# Patient Record
(demographics unavailable — no encounter records)

---

## 2024-12-23 NOTE — PHYSICAL EXAM
[Chin in Prone Position] : chin in prone position  [Chest up in Prone] : chest up in prone [Up on Forearms Prone] : up on forearms prone [Roll Prone to Supine] : roll prone to supine [Roll Supine to Prone] : rolls supine to prone [Sits With Arm Support] : sits with arm support [Unfisted] : unfisted [Manipulates Fingers] : manipulates fingers [Transfer] : transfers objects [Unilateral Reach/Grasp] : unilaterally reaches/grasps  [Alert To Sounds] : alert to sounds [Soothes When Picked Up] : soothes when picked up  [Social Smile] : has a social smile [Orients To Voice] : orients to voice [Gesture Language] : gestures language [Babbling] : babbling ["Rashid" Appropriately] : says "Rashid" appropriately ["Mama" Appropriately] : says "Mama" appropriately [Babinski] : normal Babinski [Palmar Grasp] : normal Palmar Grasp  [Plantar Grasp] : normal Plantar Grasp [Automatic Walking] : normal Automatic Walking [Placing UE] : normal Placing UE [Placing LE] : normal Placing LE  [Righting Reflex] : normal righting reflex [Lateral Protective] : normal lateral protective [Normal] : sensation is intact to light touch [de-identified] : sits unsupported  [Head Lag] : no head lag [Neck Tone] : abnormal neck tone

## 2024-12-23 NOTE — HISTORY OF PRESENT ILLNESS
[Gestational Age: ___] : Gestational Age in Weeks: [unfilled] [Chronological Age: ___] : Chronological Age in Months: [unfilled] [No Parental Concerns] : no parental concerns [None] : None [de-identified] : Damian is an ex-preemie, 36-weeker, due to PROM.  He was born . Damian was  in the NICU for 21 days. He was having apnea issues. He did not have any feeding issues. Mom doesn't have any concerns now. He has been doing well since he was discharge. He has a mild murmur and was referred to cardiology and has an appointment .  Eating/Elimination: formula Enfamil Neuro Pro 5 oz every 4 hours, some table food and some pureed food.  No swallowing issues, no choking. He doesn't have constipation. He has regular BM for age.  Sleeping: he sleeps well through the night.  Language: saying mama/papa and is babbling. he recently said his aunt's name, Jacqueline.  Gross motor: he rolls supine to prone and prone to supine. He wants to crawl mom reports. He can sit unsupported. He doesn't yet go from supine position to sitting.  Fine Motor: he reached out for toys and transfers from one hand to another.   [de-identified] : none [de-identified] : none [de-identified] : none [de-identified] : none [de-identified] : none [de-identified] : none [de-identified] : none [de-identified] : none

## 2024-12-23 NOTE — REVIEW OF SYSTEMS
[Rhinorrhea] : rhinorrhea [Cough] : cough [Negative] : Psychological  [Immunizations are up to date] : Immunizations are up to date [FreeTextEntry6] : started coughing yesterday

## 2024-12-23 NOTE — PLAN
[No delays noted, anticipatory developmental guidance given.] : No delays noted, anticipatory developmental guidance given.  [Safety counseling given regarding major safety issues for children this age.] : Safety counseling given regarding major safety issues for children this age. [Safety counseling given regarding putting babies to sleep on their backs.] : Safety counseling given regarding putting babies to sleep on their backs.  [Crib rails should be less than 2 3/8 inches apart.] : Crib rails should be less than 2 3/8 inches apart. [No pillow or bulky blankets in the cribs.] : No pillow or bulky blankets in the cribs. [Baby proofing discussed, socket plugs, cord and cable safety, tablecloth-removal.] : Baby proofing discussed, socket plugs, cord and cable safety, tablecloth-removal. [All medications should be stored in a child proof container out of reach of the child.] : All medications should be stored in a child proof container out of reach of the child.  [Reading daily was encouraged.] : Reading daily was encouraged.

## 2024-12-23 NOTE — BIRTH HISTORY
[Normal Vaginal Route] : by normal vaginal route [de-identified] : Damian is an ex-preemie, 36-weeker, due to PROM.  He was born . [FreeTextEntry1] : 5 lb 11 oz